# Patient Record
Sex: MALE | Race: WHITE | Employment: PART TIME | URBAN - METROPOLITAN AREA
[De-identification: names, ages, dates, MRNs, and addresses within clinical notes are randomized per-mention and may not be internally consistent; named-entity substitution may affect disease eponyms.]

---

## 2018-03-15 ENCOUNTER — HOSPITAL ENCOUNTER (INPATIENT)
Facility: HOSPITAL | Age: 28
LOS: 4 days | Discharge: HOME | DRG: 872 | End: 2018-03-19
Attending: HOSPITALIST | Admitting: HOSPITALIST
Payer: MEDICARE

## 2018-03-15 PROBLEM — L03.90 CELLULITIS: Status: ACTIVE | Noted: 2018-03-15

## 2018-03-15 LAB — LACTIC ACID: 1.6 MMOL/L (ref 0.5–2)

## 2018-03-15 PROCEDURE — 87040 BLOOD CULTURE FOR BACTERIA: CPT | Performed by: EMERGENCY MEDICINE

## 2018-03-15 PROCEDURE — 94660 CPAP INITIATION&MGMT: CPT

## 2018-03-15 PROCEDURE — 83605 ASSAY OF LACTIC ACID: CPT | Performed by: INTERNAL MEDICINE

## 2018-03-15 RX ORDER — SODIUM CHLORIDE 9 MG/ML
INJECTION, SOLUTION INTRAVENOUS ONCE
Status: COMPLETED | OUTPATIENT
Start: 2018-03-15 | End: 2018-03-15

## 2018-03-15 RX ORDER — CEFAZOLIN SODIUM/WATER 2 G/20 ML
2 SYRINGE (ML) INTRAVENOUS EVERY 8 HOURS
Status: DISCONTINUED | OUTPATIENT
Start: 2018-03-15 | End: 2018-03-19

## 2018-03-15 RX ORDER — CLINDAMYCIN PHOSPHATE 900 MG/50ML
900 INJECTION INTRAVENOUS EVERY 8 HOURS
Status: COMPLETED | OUTPATIENT
Start: 2018-03-15 | End: 2018-03-17

## 2018-03-15 RX ORDER — SODIUM CHLORIDE 9 MG/ML
INJECTION, SOLUTION INTRAVENOUS CONTINUOUS
Status: DISCONTINUED | OUTPATIENT
Start: 2018-03-15 | End: 2018-03-18

## 2018-03-15 RX ORDER — ACETAMINOPHEN 500 MG
1000 TABLET ORAL EVERY 6 HOURS PRN
Status: DISCONTINUED | OUTPATIENT
Start: 2018-03-15 | End: 2018-03-19

## 2018-03-15 RX ORDER — ACETAMINOPHEN 500 MG
500 TABLET ORAL EVERY 6 HOURS PRN
Status: DISCONTINUED | OUTPATIENT
Start: 2018-03-15 | End: 2018-03-15

## 2018-03-15 RX ORDER — CEFAZOLIN SODIUM/WATER 2 G/20 ML
2 SYRINGE (ML) INTRAVENOUS EVERY 8 HOURS
Status: DISCONTINUED | OUTPATIENT
Start: 2018-03-15 | End: 2018-03-15

## 2018-03-15 RX ORDER — HEPARIN SODIUM 5000 [USP'U]/ML
5000 INJECTION, SOLUTION INTRAVENOUS; SUBCUTANEOUS EVERY 8 HOURS SCHEDULED
Status: DISCONTINUED | OUTPATIENT
Start: 2018-03-15 | End: 2018-03-19

## 2018-03-15 RX ORDER — ACETAMINOPHEN 325 MG/1
650 TABLET ORAL EVERY 6 HOURS PRN
Status: DISCONTINUED | OUTPATIENT
Start: 2018-03-15 | End: 2018-03-15

## 2018-03-15 NOTE — PROGRESS NOTES
TEMP 103.3F , SINUS TACH,RESP=20,O2 SAT 98 % ON ROOM AIR. SEPSIS BPA FIRED,DR. WRAY [address] NOTIFIED. WILL MONITOR.

## 2018-03-15 NOTE — CONSULTS
INFECTIOUS DISEASE CONSULTATION    Chino Duncan Patient Status:  Inpatient    1990 MRN [id number]   [facility] [place] 3NW-A Attending Sonido Smith MD   Hosp Day # 0 PCP Yoav Pepper acute distress. Alert and oriented x 3. Vital signs: Temp:  [99.9 °F (37.7 °C)-100.9 °F (38.3 °C)] 99.9 °F (37.7 °C)  Pulse:  [] 118  Resp:  [16-18] 18  BP: (100-107)/(64-78) 107/78  HEENT: Moist mucous membranes. Extraocular muscles are intact.   Ne

## 2018-03-15 NOTE — RESPIRATORY THERAPY NOTE
CPAP    Patient would like to use own CPAP Machine. Will have patient fill out waiver form to enable usage of home cpap.

## 2018-03-15 NOTE — H&P
[facility] Hospitalist H&P       CC: No chief complaint on file.        PCP: Celeste Garcia MD    History of Present Illness:  Mr. Jame Jackson is a 31 yo male with PMH of Down syndrome, SIMEON (CPAP) at home who presented to urgent care today with abdominal pain and kg)  01/19/18 : 164 lb 1.6 oz (74.4 kg)  06/27/17 : 172 lb 3.2 oz (78.1 kg)  01/13/17 : 170 lb (77.1 kg)  01/14/16 : 164 lb (74.4 kg)  12/15/15 : 172 lb (78 kg)    Gen: No acute distress, alert   HEENT: sclera anicteric, oral mucosa moist  Pulm: Lungs alex will reasonably be expected to span two midnight's based on the clinical documentation in H+P. Based on patients current state of illness, I anticipate that, after discharge, patient will require TBD.

## 2018-03-15 NOTE — PLAN OF CARE
Patient/Family Goals    • Patient/Family Long Term Goal Not Progressing    • Patient/Family Short Term Goal Not Progressing        RISK FOR INFECTION - ADULT    • Absence of fever/infection during anticipated neutropenic period Not Progressing          ZACK

## 2018-03-16 LAB
BASOPHILS # BLD AUTO: 0.02 X10(3) UL (ref 0–0.1)
BASOPHILS NFR BLD AUTO: 0.2 %
BUN BLD-MCNC: 13 MG/DL (ref 8–20)
CALCIUM BLD-MCNC: 8 MG/DL (ref 8.3–10.3)
CHLORIDE: 103 MMOL/L (ref 101–111)
CO2: 25 MMOL/L (ref 22–32)
CREAT BLD-MCNC: 1.39 MG/DL (ref 0.7–1.3)
EOSINOPHIL # BLD AUTO: 0 X10(3) UL (ref 0–0.3)
EOSINOPHIL NFR BLD AUTO: 0 %
ERYTHROCYTE [DISTWIDTH] IN BLOOD BY AUTOMATED COUNT: 14.2 % (ref 11.5–16)
GLUCOSE BLD-MCNC: 100 MG/DL (ref 70–99)
HCT VFR BLD AUTO: 39.7 % (ref 37–53)
HGB BLD-MCNC: 13.6 G/DL (ref 13–17)
IMMATURE GRANULOCYTE COUNT: 0.03 X10(3) UL (ref 0–1)
IMMATURE GRANULOCYTE RATIO %: 0.3 %
LYMPHOCYTES # BLD AUTO: 0.5 X10(3) UL (ref 0.9–4)
LYMPHOCYTES NFR BLD AUTO: 5.8 %
MCH RBC QN AUTO: 34.1 PG (ref 27–33.2)
MCHC RBC AUTO-ENTMCNC: 34.3 G/DL (ref 31–37)
MCV RBC AUTO: 99.5 FL (ref 80–99)
MONOCYTES # BLD AUTO: 0.21 X10(3) UL (ref 0.1–1)
MONOCYTES NFR BLD AUTO: 2.4 %
NEUTROPHIL ABS PRELIM: 7.86 X10 (3) UL (ref 1.3–6.7)
NEUTROPHILS # BLD AUTO: 7.86 X10(3) UL (ref 1.3–6.7)
NEUTROPHILS NFR BLD AUTO: 91.3 %
PLATELET # BLD AUTO: 119 10(3)UL (ref 150–450)
POTASSIUM SERPL-SCNC: 4 MMOL/L (ref 3.6–5.1)
RBC # BLD AUTO: 3.99 X10(6)UL (ref 4.3–5.7)
RED CELL DISTRIBUTION WIDTH-SD: 52.4 FL (ref 35.1–46.3)
SODIUM SERPL-SCNC: 135 MMOL/L (ref 136–144)
WBC # BLD AUTO: 8.6 X10(3) UL (ref 4–13)

## 2018-03-16 PROCEDURE — 85025 COMPLETE CBC W/AUTO DIFF WBC: CPT | Performed by: INTERNAL MEDICINE

## 2018-03-16 PROCEDURE — 80048 BASIC METABOLIC PNL TOTAL CA: CPT | Performed by: INTERNAL MEDICINE

## 2018-03-16 NOTE — PLAN OF CARE
Patient assisted to bathroom and voided into toilet. Patient complained of nausea that went away after getting back to bed. Vitals rechecked at this time; Temp:99.3, Pulse: 93 NSR, RR 18, BP 93/46, O2 Sat 97% on CPAP. Patient denies pain.   Will continue t

## 2018-03-16 NOTE — PLAN OF CARE
Patient's temperature 103 and sepsis bpa initiated. Blood pressure 110/55, heartrate 110's. Ice packs placed to base of neck and bilateral axillary areas. Sheree King notified, no new orders received. Dr.Yohannon acevedo,will await response.     [id number] -

## 2018-03-16 NOTE — RESPIRATORY THERAPY NOTE
SIMEON Equipment Usage Summary :            Set Mode :AUTO CPAP W FLEX          Usage in Hours:6;40          90% Pressure (EPAP) : 7.2           90% Insp Pressure (IPAP);           AHI : 4.9          Supplemental Oxygen :      LPM

## 2018-03-16 NOTE — PROGRESS NOTES
[facility]                INFECTIOUS DISEASE PROGRESS NOTE    Ival Donate Patient Status:  Inpatient    1990 MRN [id number]   [facility] 3NW-A Attending Mercy Salguero MD   Hosp Day # 1 PCP MD Bogdan Carias examination at a health care facility     SIMEON (obstructive sleep apnea)     SIMEON on CPAP     Cellulitis      ASSESSMENT/PLAN:  1.  Acute cellulitis bilateral buttocks lower back, rapid spread, systemic sytmpoms fever   Most cw group A strep  -improvement in

## 2018-03-16 NOTE — PLAN OF CARE
PAIN - ADULT    • Verbalizes/displays adequate comfort level or patient's stated pain goal Progressing        RISK FOR INFECTION - ADULT    • Absence of fever/infection during anticipated neutropenic period Progressing        ALERT & ORIENTED. DENIES PAIN. A

## 2018-03-16 NOTE — PLAN OF CARE
Patient afebrile this morning, continues to deny pain. Redness noted to lower back and right flank, but no worse from last night. Patient voiding more, urine still clear sue. Will continue to monitor patient.

## 2018-03-16 NOTE — PLAN OF CARE
RISK FOR INFECTION - ADULT    • Absence of fever/infection during anticipated neutropenic period Not Progressing          PAIN - ADULT    • Verbalizes/displays adequate comfort level or patient's stated pain goal Progressing          Patient denies pain.  L

## 2018-03-16 NOTE — PROGRESS NOTES
[facility] Hospitalist Progress Note     [facility]      SUBJECTIVE:  Febrile to 103.5 overnight  Patient feels well this morning  Tachycardia improved  Denies abdominal pain or diarrhea    OBJECTIVE:  Temp:  [98.3 °F (36.8 °C)-103.2 °F (39.6 °C)] 98.3 °F acetaminophen (TYLENOL EXTRA STRENGTH) tab 1,000 mg 1,000 mg Oral Q6H PRN        Assessment/Plan:     Mr. Melanie Whittaker is a 33 yo male with PMH of Down syndrome, SIMEON (CPAP) at home who presented to urgent care today with abdominal pain and fever to 104.     # Sep

## 2018-03-17 LAB
BAND %: 25 %
BASOPHIL % MANUAL: 0 %
BASOPHIL ABSOLUTE MANUAL: 0 X10(3) UL (ref 0–0.1)
BASOPHILS # BLD AUTO: 0.02 X10(3) UL (ref 0–0.1)
BASOPHILS NFR BLD AUTO: 0.4 %
BUN BLD-MCNC: 15 MG/DL (ref 8–20)
CALCIUM BLD-MCNC: 7.5 MG/DL (ref 8.3–10.3)
CHLORIDE: 106 MMOL/L (ref 101–111)
CO2: 22 MMOL/L (ref 22–32)
CREAT BLD-MCNC: 1.13 MG/DL (ref 0.7–1.3)
EOSINOPHIL # BLD AUTO: 0.03 X10(3) UL (ref 0–0.3)
EOSINOPHIL % MANUAL: 0 %
EOSINOPHIL ABSOLUTE MANUAL: 0 X10(3) UL (ref 0–0.3)
EOSINOPHIL NFR BLD AUTO: 0.5 %
ERYTHROCYTE [DISTWIDTH] IN BLOOD BY AUTOMATED COUNT: 14.8 % (ref 11.5–16)
GLUCOSE BLD-MCNC: 99 MG/DL (ref 70–99)
HCT VFR BLD AUTO: 33 % (ref 37–53)
HGB BLD-MCNC: 11.3 G/DL (ref 13–17)
IMMATURE GRANULOCYTE COUNT: 0.04 X10(3) UL (ref 0–1)
IMMATURE GRANULOCYTE RATIO %: 0.7 %
LYMPHOCYTE % MANUAL: 11 %
LYMPHOCYTE ABSOLUTE MANUAL: 0.62 X10(3) UL (ref 0.9–4)
LYMPHOCYTES # BLD AUTO: 0.74 X10(3) UL (ref 0.9–4)
LYMPHOCYTES NFR BLD AUTO: 13.3 %
MCH RBC QN AUTO: 33.9 PG (ref 27–33.2)
MCHC RBC AUTO-ENTMCNC: 34.2 G/DL (ref 31–37)
MCV RBC AUTO: 99.1 FL (ref 80–99)
MONOCYTE % MANUAL: 1 %
MONOCYTE ABSOLUTE MANUAL: 0.06 X10(3) UL (ref 0.1–1)
MONOCYTES # BLD AUTO: 0.25 X10(3) UL (ref 0.1–1)
MONOCYTES NFR BLD AUTO: 4.5 %
MORPHOLOGY: NORMAL
NEUTROPHIL ABS PRELIM: 4.49 X10 (3) UL (ref 1.3–6.7)
NEUTROPHIL ABSOLUTE MANUAL: 4.93 X10(3) UL (ref 1.3–6.7)
NEUTROPHILS # BLD AUTO: 4.49 X10(3) UL (ref 1.3–6.7)
NEUTROPHILS % MANUAL: 63 %
NEUTROPHILS NFR BLD AUTO: 80.6 %
PLATELET # BLD AUTO: 119 10(3)UL (ref 150–450)
PLATELET MORPHOLOGY: NORMAL
POTASSIUM SERPL-SCNC: 3.5 MMOL/L (ref 3.6–5.1)
POTASSIUM SERPL-SCNC: 4 MMOL/L (ref 3.6–5.1)
RBC # BLD AUTO: 3.33 X10(6)UL (ref 4.3–5.7)
RED CELL DISTRIBUTION WIDTH-SD: 54.5 FL (ref 35.1–46.3)
SODIUM SERPL-SCNC: 137 MMOL/L (ref 136–144)
TOTAL CELLS COUNTED: 100
WBC # BLD AUTO: 5.6 X10(3) UL (ref 4–13)

## 2018-03-17 PROCEDURE — 84132 ASSAY OF SERUM POTASSIUM: CPT | Performed by: INTERNAL MEDICINE

## 2018-03-17 PROCEDURE — 85007 BL SMEAR W/DIFF WBC COUNT: CPT | Performed by: INTERNAL MEDICINE

## 2018-03-17 PROCEDURE — 85027 COMPLETE CBC AUTOMATED: CPT | Performed by: INTERNAL MEDICINE

## 2018-03-17 PROCEDURE — 85025 COMPLETE CBC W/AUTO DIFF WBC: CPT | Performed by: INTERNAL MEDICINE

## 2018-03-17 PROCEDURE — 80048 BASIC METABOLIC PNL TOTAL CA: CPT | Performed by: INTERNAL MEDICINE

## 2018-03-17 RX ORDER — POTASSIUM CHLORIDE 20 MEQ/1
40 TABLET, EXTENDED RELEASE ORAL EVERY 4 HOURS
Status: COMPLETED | OUTPATIENT
Start: 2018-03-17 | End: 2018-03-17

## 2018-03-17 NOTE — PROGRESS NOTES
[facility] Hospitalist Progress Note     [facility]      SUBJECTIVE:  101 overnight. Looks better per mom and Dr Corine Cadet. Eating and drinking ok.       OBJECTIVE:  Temp:  [98 °F (36.7 °C)-101 °F (38.3 °C)] 98.4 °F (36.9 °C)  Pulse:  [] 84  Resp:  [16 Q8H   acetaminophen (TYLENOL EXTRA STRENGTH) tab 1,000 mg 1,000 mg Oral Q6H PRN        Assessment/Plan:     Mr. Adolph Severin is a 33 yo male with PMH of Down syndrome, SIMEON (CPAP) at home who presented to urgent care today with abdominal pain and fever to 104.     #

## 2018-03-17 NOTE — RESPIRATORY THERAPY NOTE
SIMEON : EQUIPMENT USE: DAILY SUMMARY                                            SET MODE: AUTO CPAP WITH CFLEX                                          USAGE IN HOURS: 8:20                                          90

## 2018-03-17 NOTE — PLAN OF CARE
PAIN - ADULT    • Verbalizes/displays adequate comfort level or patient's stated pain goal Progressing        RISK FOR INFECTION - ADULT    • Absence of fever/infection during anticipated neutropenic period Progressing        The patient denies any pain an

## 2018-03-17 NOTE — PROGRESS NOTES
[facility]                INFECTIOUS DISEASE PROGRESS NOTE    Debbi Huston Patient Status:  Inpatient    1990 MRN [id number]   [facility] 3NW-A Attending Pool Hercules MD   Hosp Day # 2 PCP MD Tobin Bailon (obstructive sleep apnea)     SIMEON on CPAP     Cellulitis      ASSESSMENT/PLAN:  1.  Acute cellulitis bilateral buttocks lower back, rapid spread, systemic sytmpoms fever   Most cw group A strep  Slow improvement on iv cefazolin  Dc clindamycin  Possible dc

## 2018-03-17 NOTE — PROGRESS NOTES
Assumed patient care at [phone]. Febrile this shift. Tylenol ES given per MAR. Denies any pain or discomfort. No n/v/d. NSR on tele. CPAP at night. On IV clinda and ancef for cellulitis. Will continue to monitor.

## 2018-03-18 NOTE — PROGRESS NOTES
[facility] Hospitalist Progress Note     [facility]      SUBJECTIVE:  Afebrile. Rash/erythema improving. Eating well.       Mom at bedside       OBJECTIVE:  Temp:  [97.7 °F (36.5 °C)-99.3 °F (37.4 °C)] 97.7 °F (36.5 °C)  Pulse:  [78-91] 78  Resp:  [16-20] Intravenous Q8H   acetaminophen (TYLENOL EXTRA STRENGTH) tab 1,000 mg 1,000 mg Oral Q6H PRN        Assessment/Plan:     Mr. Rose Quiñones is a 31 yo male with PMH of Down syndrome, SIMEON (CPAP) at home who presented to urgent care today with abdominal pain and fever t

## 2018-03-18 NOTE — RESPIRATORY THERAPY NOTE
SIMEON : EQUIPMENT USE: DAILY SUMMARY                                            SET MODE: AUTO CPAP WITH CFLEX                                          USAGE IN HOURS: 2:54                                          90

## 2018-03-19 VITALS
BODY MASS INDEX: 27 KG/M2 | OXYGEN SATURATION: 98 % | TEMPERATURE: 97 F | RESPIRATION RATE: 18 BRPM | WEIGHT: 163 LBS | SYSTOLIC BLOOD PRESSURE: 114 MMHG | HEART RATE: 77 BPM | DIASTOLIC BLOOD PRESSURE: 64 MMHG

## 2018-03-19 RX ORDER — CEPHALEXIN 500 MG/1
CAPSULE ORAL
Qty: 120 CAPSULE | Refills: 0 | Status: SHIPPED | OUTPATIENT
Start: 2018-03-19 | End: 2019-06-05

## 2018-03-19 NOTE — PROGRESS NOTES
To Whom it may Concern:      Nj Albrecht ( 1990) was hospitalized at [facility] from 3/15-3/19/2018 and thus was unable to make travel plans as scheduled. His mom is his primary care giver and needed to be with him during this time.   He has no fu

## 2018-03-19 NOTE — PROGRESS NOTES
[facility]                INFECTIOUS DISEASE PROGRESS NOTE    Debbi Huston Patient Status:  Inpatient    1990 MRN [id number]   [facility] 3NW-A Attending Pool Hercules MD   [facility] Day # 4 PCP MD Tobin Bailon (obstructive sleep apnea)     SIMEON on CPAP     Cellulitis      ASSESSMENT/PLAN:  1.  Acute cellulitis bilateral buttocks lower back, rapid spread, systemic sytmpoms fever   Most cw group A strep  Improved on iv cefazolin  Home today with PO keflex  Fu dermat

## 2018-03-19 NOTE — RESPIRATORY THERAPY NOTE
SIMEON : EQUIPMENT USE: DAILY SUMMARY                                            SET MODE: AUTO CPAP WITH CFLEX                                          USAGE IN HOURS: 8:40                                          90

## 2018-03-19 NOTE — DISCHARGE SUMMARY
General Medicine Discharge Summary     Patient ID:  Shannan Waller  32year old  6/9/1990    Admit date: 3/15/2018    Discharge date and time: 3/19/2018    Attending Physician: Alden Lopez MD     P Cap  2 tabs 3x/day for 10 days, then 1 tab 2x/day for 30 days      CONTINUE these medications which have NOT CHANGED    Ketoconazole 2 % External Shampoo  Apply to scalp twice a week for four weeks          Activity: activity as tolerated  Diet: regular di

## 2018-03-29 PROBLEM — L03.90 CELLULITIS: Status: RESOLVED | Noted: 2018-03-15 | Resolved: 2018-03-29

## 2019-06-05 PROCEDURE — 86480 TB TEST CELL IMMUN MEASURE: CPT | Performed by: FAMILY MEDICINE

## 2019-06-05 PROCEDURE — 86256 FLUORESCENT ANTIBODY TITER: CPT | Performed by: FAMILY MEDICINE

## 2019-06-05 PROCEDURE — 82784 ASSAY IGA/IGD/IGG/IGM EACH: CPT | Performed by: FAMILY MEDICINE
